# Patient Record
(demographics unavailable — no encounter records)

---

## 2025-04-15 NOTE — PHYSICAL EXAM
[Appropriately responsive] : appropriately responsive [Alert] : alert [No Acute Distress] : no acute distress [No Lymphadenopathy] : no lymphadenopathy [Regular Rate Rhythm] : regular rate rhythm [No Murmurs] : no murmurs [Clear to Auscultation B/L] : clear to auscultation bilaterally [Soft] : soft [Non-tender] : non-tender [Non-distended] : non-distended [No HSM] : No HSM [No Lesions] : no lesions [No Mass] : no mass [Oriented x3] : oriented x3 [FreeTextEntry1] : Normal, no lesions [FreeTextEntry2] : Normal, no lesions [FreeTextEntry4] : Normal, no lesions seen or palpated.  Scanty clear discharge in vault [FreeTextEntry5] : Smooth, pink, no lesions.  No cervical motion tenderness, IUD strings visible [FreeTextEntry6] : Anteverted, small, mobile, nontender.  No adnexal masses or tenderness bilaterally

## 2025-04-15 NOTE — HISTORY OF PRESENT ILLNESS
[Patient reported mammogram was normal] : Patient reported mammogram was normal [Patient reported PAP Smear was normal] : Patient reported PAP Smear was normal [Patient reported colonoscopy was normal] : Patient reported colonoscopy was normal [LMP unknown] : LMP unknown [IUD] : has an intrauterine device [Monogamous (Male Partner)] : is monogamous with a male partner [Y] : Positive pregnancy history [TextBox_4] : History of severe menorrhagia for which she has a Mirena in place since 2020 [Mammogramdate] : 04/2024 [TextBox_19] : SMI-benign [PapSmeardate] : 1/2024 [TextBox_31] : Negative [ColonoscopyDate] : 2024 [TextBox_43] : Normal, due every 10 years [PGxTotal] : 7 [PGxPremature] : 4 [PGHxAbortions] : 2 [PGHxABSpont] : 1 [FreeTextEntry1] :  x 4 [TextBox_28] : History of menorrhagia, treated with Mirena IUD placement in December 2020 [TextBox_18] : Mild atrophic vaginitis for which she uses [employer] as needed relations [Currently Active] : currently active [Men] : men [Vaginal] : vaginal

## 2025-04-15 NOTE — PLAN
[FreeTextEntry1] : Pap smear with HPV testing drawn and sent as she is 55 years old. Prescription for FSH and estradiol level and put into the system so that we can assess the status of where she is in regards to menopause.  Patient prefers to leave the Mirena IUD in place for the complete 7 to 8 years will, which will happen when she turns 57 or 58. Prescription for mammogram and bone density given to her.